# Patient Record
Sex: MALE | Race: WHITE | NOT HISPANIC OR LATINO | Employment: OTHER | URBAN - METROPOLITAN AREA
[De-identification: names, ages, dates, MRNs, and addresses within clinical notes are randomized per-mention and may not be internally consistent; named-entity substitution may affect disease eponyms.]

---

## 2017-06-14 ENCOUNTER — ALLSCRIPTS OFFICE VISIT (OUTPATIENT)
Dept: OTHER | Facility: OTHER | Age: 62
End: 2017-06-14

## 2017-06-14 PROCEDURE — 88304 TISSUE EXAM BY PATHOLOGIST: CPT | Performed by: DERMATOLOGY

## 2017-06-19 ENCOUNTER — LAB REQUISITION (OUTPATIENT)
Dept: LAB | Facility: HOSPITAL | Age: 62
End: 2017-06-19

## 2017-06-19 DIAGNOSIS — D21.9 BENIGN NEOPLASM OF CONNECTIVE AND OTHER SOFT TISSUE, UNSPECIFIED: ICD-10-CM

## 2017-06-22 ENCOUNTER — GENERIC CONVERSION - ENCOUNTER (OUTPATIENT)
Dept: OTHER | Facility: OTHER | Age: 62
End: 2017-06-22

## 2017-08-21 ENCOUNTER — GENERIC CONVERSION - ENCOUNTER (OUTPATIENT)
Dept: OTHER | Facility: OTHER | Age: 62
End: 2017-08-21

## 2018-01-12 NOTE — RESULT NOTES
Verified Results  (1) TISSUE EXAM 21Jun2017 11:23AM Michael Mathew     Test Name Result Flag Reference   LAB AP CASE REPORT (Report)     Surgical Pathology Report             Case: [id number]                   Authorizing Provider: Dustin Lopes MD     Collected:      06/14/2017           Pathologist:      Mona Valle MD      Received:      06/19/2017 1006        Specimen:  Skin, Cyst/Tag/Debridement, Left upper back   LAB AP FINAL DIAGNOSIS      A  Skin, Cyst/Tag/Debridement, Left upper back, excision:  - Lipofibroma  See Note  Interpretation performed at [facility], [address] [place] [address]  Electronically signed by Mona Valle MD on 6/21/2017 at 11:23 AM   LAB AP NOTE      The second submitted skin fragment is presumed base of the lipofibroma  This fragment is tangentially sectioned with some features of a   pigmented/macular seborrheic keratosis  No malignancy is identified  LAB AP SURGICAL ADDITIONAL INFORMATION (Report)     These tests were developed and their performance characteristics   determined by [facility] or [facility]  They may not be cleared or approved by the [place]  Food and   Drug Administration  The FDA has determined that such clearance or   approval is not necessary  These tests are used for clinical purposes  They should not be regarded as investigational or for research  This   laboratory has been approved by [facility], designated as a high-complexity   laboratory and is qualified to perform these tests  LAB AP GROSS DESCRIPTION (Report)     A  The specimen is received in formalin, labeled with the patient's name   and hospital number, and is designated left upper back  The specimen   consists of a tan wrinkled exophytic portion of skin measuring 1 8 x 1 2 x   0 7 cm  The apparent margin of resection is painted with green ink  The   specimen is serially sectioned revealing tan-white cut surfaces   Also   submitted in the container is a tan disrupted portion of skin measuring   1 0 x 0 4 and less than 0 1 cm  The epithelial surface exhibits a defect   measuring 0 5 x 0 3 cm  The apparent margin of resection is painted with   green ink  The specimen is trisected revealing tan cut surfaces  The   entire specimen is submitted in 5 cassettes  1???3: First described portion of skin with the tips in cassette 1 and the   center trisected in cassettes 2? ??3  4???5: Second described portion of skin with the tips in cassette 4 and the   center in cassette 5    Formalin time not given   AEK   LAB AP CLINICAL INFORMATION Fibroma

## 2018-01-23 NOTE — PROGRESS NOTES
Chief Complaint  CC: skin check up  History of Present Illness  62-year-old male presents for overall checkup concerns regarding a growing lesion on his left back and as area that gets irritated in his mid back and overall concerned regarding potential skin cancer      Assessment  Assessed    1  Seborrheic keratoses (702 19) (L82 1)   2  Screening for skin condition (V82 0) (Z13 89)   3  Fibroma (215 9) (D21 9)   4  Follicular cyst of skin (706 2) (L72 9)    Past Medical History    The past medical history was reviewed  Surgical History    Surgical History reviewed      Family History  Family History Reviewed- Derm:   Family History was reviewed      Social History  The social history was reviewed      Review of Systems    Constitutional: Denies constitutional symptoms  Eyes: Denies eye symptoms  ENT:  denies ear symptoms, nasal symptoms, mouth or throat symptoms  Cardiovascular: Denies cardiovascular symptoms  Respiratory: Denies respiratory symptoms  Gastrointestinal: Denies gastrointestinal symptoms  Musculoskeletal: Denies musculoskeletal symptoms  Integumentary: Denies symptoms other than stated above  Neurological: Denies neurologic symptoms  Psychiatric: Denies psychiatric symptoms  Endocrine: Denies endocrine symptoms  Hematologic/Lymphatic: Denies hematologic symptoms  Physical Exam    Constitutional   General appearance: Appears healthy and well developed  Lymphatic   No visible disturbance  Musculoskeletal   Digits and nails: No clubbing, cyanosis or edema  Cutaneous and nail exam normal     Skin   Scalp skin texture and hair distribution: Normal skin texture on scalp, normal hair distribution  Head: Normal turgor, no rashes, no lesions  Neck: Normal turgor, no rashes, no lesions  Chest: Normal turgor, no rashes, no lesions  Abdomen: Normal turgor, no rashes, no lesions      Back: Abnormal     Right upper extremity: Normal turgor, no rashes, no lesions  Left upper extremity: Normal turgor, no rashes, no lesions  Right lower extremity: Normal turgor, no rashes, no lesions  Left lower extremity: Normal turgor, no rashes, no lesions  Neuro/Psych   Alert and oriented x 3  Displays comfort and cooperation during encounterl  Affect is normal     Finding 2 cm cystic nodule mid back keratotic nodule pedunculated slightly 1 cm left upper back normal keratotic papules with greasy stuck on appearance nothing else atypical noted on exam       Procedure    Procedure: skin biopsy  Indications for the procedure include the lesion has changed  Risks, benefits, alternatives, infection risk, bleeding risk, risk of allergic reaction and risk of scarring were discussed with the patient   verbal consent was obtained prior to the procedure  Procedure Note:   Anesthesia: 1 ml of lidocaine 1% with epinephrine  The lesion was located on the Left upper back  The patient was prepped using alcohol  a shave biopsy of the lesion was taken  The hemostasis of the wound was achieved with aluminum chloride  Dressing: The wound was cleaned and petroleum jelly was applied and a sterile dressing was placed  Specimen: the excised lesion was place in buffered formalin and sent for pathology  Post-Procedure:   Patient Status: the patient tolerated the procedure well  Complications: there were no complications  Follow-up in the office  patient will be called in event skin cancer is found  Patient can call the office in 7-10 days for results if not contacted  Plan  Fibroma    · Wound care as instructed ; Status:Complete;   Done: [id number]   · Schedule Surgery Treatment  Procedure  Status: Complete  Done: [id number]    Discussion/Summary    Assessment #1: Fibroma  Care Plan:   Lesion on the left upper back appears to be a benign fibroma await week result of biopsy further treatment indicated     Assessment #2: Follicular cyst    Care Plan:   Since this is symptomatic patient wishes and requests removal we'll plan on doing this in the near future  Assessment #3: Seborrheic keratosis  Care Plan:   Patient reassured these are normal growths we acquire with age no treatment needed  Assessment #4: Screening for dermatologic disorders  Care Plan:   Nothing else of concern noted on complete exam sun protection recommended follow-up yearly        Signatures   Electronically signed by : PARMJIT Lane ; Jun 14 2017  4:05PM EST                       (Author)

## 2018-07-17 ENCOUNTER — HOSPITAL ENCOUNTER (EMERGENCY)
Facility: HOSPITAL | Age: 63
Discharge: HOME | End: 2018-07-17
Attending: EMERGENCY MEDICINE
Payer: COMMERCIAL

## 2018-07-17 ENCOUNTER — APPOINTMENT (EMERGENCY)
Dept: CT IMAGING | Facility: HOSPITAL | Age: 63
End: 2018-07-17
Payer: COMMERCIAL

## 2018-07-17 ENCOUNTER — APPOINTMENT (EMERGENCY)
Dept: RADIOLOGY | Facility: HOSPITAL | Age: 63
End: 2018-07-17
Payer: COMMERCIAL

## 2018-07-17 VITALS
OXYGEN SATURATION: 98 % | WEIGHT: 236 LBS | DIASTOLIC BLOOD PRESSURE: 101 MMHG | BODY MASS INDEX: 31.28 KG/M2 | HEIGHT: 73 IN | TEMPERATURE: 98 F | HEART RATE: 94 BPM | SYSTOLIC BLOOD PRESSURE: 170 MMHG | RESPIRATION RATE: 19 BRPM

## 2018-07-17 DIAGNOSIS — S09.90XA: Primary | ICD-10-CM

## 2018-07-17 DIAGNOSIS — S40.012A CONTUSION OF LEFT SHOULDER: ICD-10-CM

## 2018-07-17 PROCEDURE — 99284 EMERGENCY DEPT VISIT MOD MDM: CPT

## 2018-07-17 PROCEDURE — 70450 CT HEAD/BRAIN W/O DYE: CPT

## 2018-07-17 PROCEDURE — 73030 X-RAY EXAM OF SHOULDER: CPT

## 2018-07-17 NOTE — ED PROVIDER NOTES
History  Chief Complaint   Patient presents with    Head Injury     pt states he has a brain tumor and seizures, pt states he hit his head twice on saturday night  This is a 60-year-old male with a history of brain tumor status post biopsy in May of this year who presents for evaluation of a head injury  He states he had 2 breakthrough seizures Saturday for which she went to [facility] for seizure activity  He had evaluation for seizures and his workup was unremarkable  He notes that afterwards while he was in the stretcher they were trying to move him and they dropped him, twice  The did not repeat a CT of his head afterwards  He contacted his heme oncologist today  His Hemoccult is recommended he go in for evaluation to a different ER  He is completely asymptomatic at this time, denies headache, visual disturbances, nausea or vomiting  He states he feels fine but is heme oncologist wanted him to be safe and be evaluated  Does not take anticoagulants  He states his seizures are recurrent issue for him because of his brain tumor  This is nothing new for him  He does take Keppra  Follows with his heme oncologist on Friday for follow-up appointment  History provided by:  Patient   used: No    Head Injury w/unknown LOC   Location:  Generalized  Time since incident:  3 days  Mechanism of injury: fall    Fall:     Fall occurred: From in the ER stretcher      Height of fall:  2-3ft    Impact surface:  Hard floor    Point of impact:  Unable to specify    Entrapped after fall: no    Pain details:     Severity:  No pain  Chronicity:  New  Relieved by:  Nothing  Worsened by:  Nothing  Ineffective treatments:  None tried  Associated symptoms: headache    Associated symptoms: no blurred vision, no difficulty breathing, no disorientation, no double vision, no focal weakness, no hearing loss, no loss of consciousness, no memory loss, no nausea, no neck pain, no numbness, no seizures, no tinnitus and no vomiting    Risk factors: no alcohol use, no aspirin use, not elderly, no obesity and no occupational exposure        None       Past Medical History:   Diagnosis Date    [facility] ([facility] )     [facility]        History reviewed  No pertinent surgical history  History reviewed  No pertinent family history  I have reviewed and agree with the history as documented  Social History   Substance Use Topics    Smoking status: Never Smoker    Smokeless tobacco: Never Used    Alcohol use No        Review of Systems   Constitutional: Negative for activity change, appetite change, chills, diaphoresis, fatigue, fever and unexpected weight change  HENT: Negative for congestion, hearing loss, rhinorrhea, sinus pressure, sore throat, tinnitus and trouble swallowing  Eyes: Negative for blurred vision, double vision, photophobia and visual disturbance  Respiratory: Negative for apnea, cough, choking, chest tightness, shortness of breath, wheezing and stridor  Cardiovascular: Negative for chest pain, palpitations and leg swelling  Gastrointestinal: Negative for abdominal distention, abdominal pain, blood in stool, constipation, diarrhea, nausea and vomiting  Genitourinary: Negative for decreased urine volume, difficulty urinating, dysuria, enuresis, flank pain, frequency, hematuria and urgency  Musculoskeletal: Negative for arthralgias, myalgias, neck pain and neck stiffness  Skin: Negative for color change, pallor, rash and wound  Allergic/Immunologic: Negative  Neurological: Positive for headaches  Negative for dizziness, tremors, focal weakness, seizures, loss of consciousness, syncope, weakness, light-headedness and numbness  Hematological: Negative  Psychiatric/Behavioral: Negative  Negative for memory loss  All other systems reviewed and are negative  Physical Exam  Physical Exam   Constitutional: He is oriented to person, place, and time   He appears well-developed and well-nourished  Non-toxic appearance  He does not have a sickly appearance  He does not appear ill  No distress  HENT:   Head: Normocephalic and atraumatic  Eyes: EOM and lids are normal  Pupils are equal, round, and reactive to light  Neck: Normal range of motion  Neck supple  Cardiovascular: Normal rate, regular rhythm, S1 normal, S2 normal, normal heart sounds, intact distal pulses and normal pulses  Exam reveals no gallop, no distant heart sounds, no friction rub and no decreased pulses  No murmur heard  Pulses:       Radial pulses are 2+ on the right side, and 2+ on the left side  Pulmonary/Chest: Effort normal and breath sounds normal  No accessory muscle usage  No apnea, no tachypnea and no bradypnea  No respiratory distress  He has no decreased breath sounds  He has no wheezes  He has no rhonchi  He has no rales  Abdominal: Soft  Normal appearance and bowel sounds are normal  He exhibits no distension and no mass  There is no tenderness  There is no rigidity, no rebound and no guarding  No hernia  Musculoskeletal: Normal range of motion  He exhibits no edema, tenderness or deformity  Neurological: He is alert and oriented to person, place, and time  No cranial nerve deficit  GCS eye subscore is 4  GCS verbal subscore is 5  GCS motor subscore is 6  GCS 15  AAOx3  No focal neuro deficits  CN II-XII grossly intact  Speech normal, no aphasia or dysarthria  No pronator drift  Cerebellar function normal  Finger to nose normal  PERRL  EOMI  Peripheral vision intact  No nystagmus  Upper and lower extremity strength 5/5 through   strength 5/5 b/l  Gross sensation intact b/l  Skin: Skin is warm, dry and intact  No rash noted  He is not diaphoretic  No erythema  No pallor  Psychiatric: His speech is normal    Nursing note and vitals reviewed        Vital Signs  ED Triage Vitals [07/17/18 1704]   Temperature Pulse Respirations Blood Pressure SpO2   98 °F (36 7 °C) 72 20 (!) 159/105 96 %      Temp Source Heart Rate Source Patient Position - Orthostatic VS BP Location FiO2 (%)   Oral Monitor Sitting Left arm --      Pain Score       No Pain           Vitals:    07/17/18 1704 07/17/18 1825   BP: (!) 159/105 (!) 170/101   Pulse: 72 94   Patient Position - Orthostatic VS: Sitting Sitting       Visual Acuity  Visual Acuity      Most Recent Value   L Pupil Size (mm)  3   R Pupil Size (mm)  3          ED Medications  Medications - No data to display    Diagnostic Studies  Results Reviewed     None                 CT head without contrast   Final Result by Frandy Cervantes MD (07/17 2022)      ight upper parietal craniotomy; there is underlying effacement of the right frontoparietal sulci in keeping with known mass at this area  Edema and diffuse hypodensity of the right thalamus in keeping with known right thalamic mass  The study was marked in [facility] for immediate notification  Workstation performed: [id number]         XR shoulder 2+ vw left   Final Result by Frandy Cervantes MD (07/17 1930)      Mild glenohumeral and acromioclavicular degenerative changes  High riding humeral head in keeping with chronic rotator cuff injury  Workstation performed: [id number]                    Procedures  Procedures       Phone Contacts  ED Phone Contact    ED Course                               MDM  Number of Diagnoses or Management Options  Acute head injury: new and requires workup  Contusion of left shoulder: new and requires workup  Diagnosis management comments: Differential diagnosis includes but is not limited to contusion, tension headache, worried but well, intracranial hemorrhage  Plan:  He has nonfocal neuro exam and is asymptomatic  We discussed risks and benefits  He would like to proceed with CT head  Disposition pending         Amount and/or Complexity of Data Reviewed  Clinical lab tests: ordered and reviewed  Tests in the radiology section of CPT®: ordered and reviewed  Independent visualization of images, tracings, or specimens: yes    Risk of Complications, Morbidity, and/or Mortality  Presenting problems: moderate  Management options: low  General comments: 14-year-old male with head injury  CT head negative for acute abnormalities  Nonfocal neuro exam, no complaints at this time  Safe for discharge for outpatient follow-up  He has already had a workup for his seizures and has not had any breakthrough seizures at this time  He agrees with deferring any further workup and will return should he began having seizures again  He will follow up with his oncologist on Friday for scheduled appointment  We discussed earlier return parameters  Patient understands and agrees with the plan  Patient Progress  Patient progress: stable    CritCare Time    Disposition  Final diagnoses:   Acute head injury   Contusion of left shoulder     Time reflects when diagnosis was documented in both MDM as applicable and the Disposition within this note     Time User Action Codes Description Comment    7/17/2018  8:27 PM Matthew Cooley [[id number]] Acute head injury     7/17/2018  8:27 PM Matthew Cooley [[id number]] Contusion of left shoulder       ED Disposition     ED Disposition Condition Comment    Discharge  Tatyana David discharge to home/self care  Condition at discharge: Good        Follow-up Information     Follow up With Specialties Details Why Contact Info    Your Heme/Onc Physician  Go to scheduled appointment previously established          There are no discharge medications for this patient  No discharge procedures on file      ED Provider  Electronically Signed by           Hillary Rae PA-C  07/17/18 7968

## 2018-07-18 NOTE — DISCHARGE INSTRUCTIONS
Return to the Emergency Department sooner if increased pain, fever, vomiting, lethargy, blurry vision, dizziness, weakness, difficulty walking or breathing, rash  Head Injury   WHAT YOU NEED TO KNOW:   A head injury is most often caused by a blow to the head  This may occur from a fall, bicycle injury, sports injury, being struck in the head, or a motor vehicle accident  DISCHARGE INSTRUCTIONS:   Call 911 or have someone else call for any of the following:   · You cannot be woken  · You have a seizure  · You stop responding to others or you faint  · You have blurry or double vision  · Your speech becomes slurred or confused  · You have arm or leg weakness, loss of feeling, or new problems with coordination  · Your pupils are larger than usual or one pupil is a different size than the other  · You have blood or clear fluid coming out of your ears or nose  Return to the emergency department if:   · You have repeated or forceful vomiting  · You feel confused  · Your headache gets worse or becomes severe  · You or someone caring for you notices that you are harder to wake than usual   Contact your healthcare provider if:   · Your symptoms last longer than 6 weeks after the injury  · You have questions or concerns about your condition or care  Medicines:   · Acetaminophen  decreases pain  Acetaminophen is available without a doctor's order  Ask how much to take and how often to take it  Follow directions  Acetaminophen can cause liver damage if not taken correctly  · Take your medicine as directed  Contact your healthcare provider if you think your medicine is not helping or if you have side effects  Tell him or her if you are allergic to any medicine  Keep a list of the medicines, vitamins, and herbs you take  Include the amounts, and when and why you take them  Bring the list or the pill bottles to follow-up visits   Carry your medicine list with you in case of an emergency  Self-care:   · Rest  or do quiet activities for 24 to 48 hours  Limit your time watching TV, using the computer, or doing tasks that require a lot of thinking  Slowly return to your normal activities as directed  Do not play sports or do activities that may cause you to get hit in the head  Ask your healthcare provider when you can return to sports  · Apply ice  on your head for 15 to 20 minutes every hour or as directed  Use an ice pack, or put crushed ice in a plastic bag  Cover it with a towel before you apply it to your skin  Ice helps prevent tissue damage and decreases swelling and pain  · Have someone stay with you for 24 hours  or as directed  This person can monitor you for complications and call [phone]  When you are awake the person should ask you a few questions to see if you are thinking clearly  An example would be to ask your name or your address  Prevent another head injury:   · Wear a helmet that fits properly  Do this when you play sports, or ride a bike, scooter, or skateboard  Helmets help decrease your risk of a serious head injury  Talk to your healthcare provider about other ways you can protect yourself if you play sports  · Wear your seat belt every time you are in a car  This helps to decrease your risk for a head injury if you are in a car accident  Follow up with your healthcare provider as directed:  Write down your questions so you remember to ask them during your visits  © 2017 [employer] Information is for End User's use only and may not be sold, redistributed or otherwise used for commercial purposes  All illustrations and images included in CareNotes® are the copyrighted property of [employer]  or [employer]ings[employer]  The above information is an  only  It is not intended as medical advice for individual conditions or treatments   Talk to your doctor, nurse or pharmacist before following any medical regimen to see if it is safe and effective for you

## 2020-07-24 ENCOUNTER — TELEPHONE (OUTPATIENT)
Dept: OBGYN CLINIC | Facility: HOSPITAL | Age: 65
End: 2020-07-24